# Patient Record
Sex: MALE | Race: AMERICAN INDIAN OR ALASKA NATIVE
[De-identification: names, ages, dates, MRNs, and addresses within clinical notes are randomized per-mention and may not be internally consistent; named-entity substitution may affect disease eponyms.]

---

## 2018-09-06 ENCOUNTER — HOSPITAL ENCOUNTER (EMERGENCY)
Dept: HOSPITAL 31 - C.ER | Age: 43
LOS: 1 days | Discharge: HOME | End: 2018-09-07
Payer: MEDICAID

## 2018-09-06 VITALS
RESPIRATION RATE: 20 BRPM | OXYGEN SATURATION: 96 % | DIASTOLIC BLOOD PRESSURE: 80 MMHG | HEART RATE: 85 BPM | TEMPERATURE: 98.4 F | SYSTOLIC BLOOD PRESSURE: 134 MMHG

## 2018-09-06 DIAGNOSIS — M75.91: Primary | ICD-10-CM

## 2018-09-06 PROCEDURE — 99283 EMERGENCY DEPT VISIT LOW MDM: CPT

## 2018-09-06 PROCEDURE — 73030 X-RAY EXAM OF SHOULDER: CPT

## 2018-09-06 PROCEDURE — 96372 THER/PROPH/DIAG INJ SC/IM: CPT

## 2018-09-07 NOTE — RAD
Date of service: 



09/06/2018



PROCEDURE:  Radiographs of the Right Shoulder



HISTORY:

Shoulder pain



COMPARISON:

No prior.



FINDINGS:



BONES:

Bone alignment and mineralization are normal.  There is no acute 

displaced fracture or bone destruction.



JOINTS:

Normal. Glenohumeral and acromioclavicular joints preserved. 



SOFT TISSUES:

Normal.



OTHER FINDINGS:

None. 



IMPRESSION:

No acute fracture or dislocation.

## 2018-09-07 NOTE — C.PDOC
History Of Present Illness


43 year old male presents to the ER with a complaint of right shoulder pain for 

the past 5 days that radiates to the right hand. He has been taking motrin with 

some relief. Denies heavy lifting, recent fall, chest pain, SOB, weakness, 

numbness, nausea, or vomiting.


Time Seen by Provider: 09/06/18 23:08


Chief Complaint (Nursing): Upper Extremity Problem/Injury


History Per: Patient


History/Exam Limitations: no limitations


Onset/Duration Of Symptoms: Days


Current Symptoms Are (Timing): Still Present


Exacerbating Factor(s): Strenuous Use Of Affected Area


Recent travel outside of the [place]: No





Past Medical History


Reviewed: Historical Data, Nursing Documentation, Vital Signs


Vital Signs: 


 Last Vital Signs











Temp  98.4 F   09/06/18 22:57


 


Pulse  85   09/06/18 22:57


 


Resp  20   09/06/18 22:57


 


BP  134/80   09/06/18 22:57


 


Pulse Ox  96   09/07/18 00:20











Family History: States: Unknown Family Hx





- Social History


Hx Alcohol Use: Yes


Hx Substance Use: No





- Immunization History


Hx Tetanus Toxoid Vaccination: No


Hx Influenza Vaccination: No


Hx Pneumococcal Vaccination: No





Review Of Systems


Cardiovascular: Negative for: Chest Pain


Respiratory: Negative for: Shortness of Breath


Gastrointestinal: Negative for: Nausea, Vomiting


Musculoskeletal: Positive for: Shoulder Pain


Neurological: Negative for: Weakness, Numbness





Physical Exam





- Physical Exam


Appears: Non-toxic


Skin: Normal Color, Warm, Dry, No Rash


Head: Atraumatic, Normacephalic


Eye(s): bilateral: Normal Inspection


Oral Mucosa: Moist


Neck: Normal ROM, No Midline Cervical Tenderness, No Paracervical Tenderness, 

Supple


Extremity: Capillary Refill (<2 seconds), No Swelling, Other (Tenderness with 

external and internal rotation)


Pulses: Left Radial: Normal, Right Radial: Normal, Left Dorsalis Pedis: Normal, 

Right Dorsalis Pedis: Normal


Neurological/Psych: Oriented x3, Normal Speech, Normal Motor, Normal Sensation


Gait: Steady





ED Course And Treatment


O2 Sat by Pulse Oximetry: 96 (Room air)


Pulse Ox Interpretation: Normal





- Other Rad


  ** Right shoulder x-ray


X-Ray: Interpreted by Me, Viewed By Me


Interpretation: No acute fractures or dislocations.





Medical Decision Making


Medical Decision Making: 


Right shoulder x-ray ordered, results were negative. Decadron and toradol 

administered. On reevaluation, patient is resting comfortably in the ER in no 

acute distress, vitals are stable, will discharge home with Rx and instructions 

to follow up with PMD.





Disposition





- Disposition


Referrals: 


[facility] [Outside]


Silvano Boyle III, MD [Staff Provider] - 


Arturo Whittaker MD [Staff Provider] - 


Disposition: HOME/ ROUTINE


Disposition Time: 00:15


Condition: GOOD


Additional Instructions: 


Follow up with the medical doctor within 1-2 days. Return if worsened. 


Prescriptions: 


Naproxen [Naprosyn] 500 mg PO BID #20 tab


predniSONE [Prednisone] 10 mg PO BID #10 tab


Instructions:  Tendonitis


Forms:  [employer] (English)





- Clinical Impression


Clinical Impression: 


 Shoulder tendonitis








- PA / NP / Resident Statement


MD/DO has reviewed & agrees with the documentation as recorded.





- Scribe Statement


The provider has reviewed the documentation as recorded by the Scribe





Reyes Lynn





All medical record entries made by the Scribe were at my direction and 

personally dictated by me. I have reviewed the chart and agree that the record 

accurately reflects my personal performance of the history, physical exam, 

medical decision making, and the department course for this patient. I have 

also personally directed, reviewed, and agree with the discharge instructions 

and disposition.